# Patient Record
Sex: MALE
[De-identification: names, ages, dates, MRNs, and addresses within clinical notes are randomized per-mention and may not be internally consistent; named-entity substitution may affect disease eponyms.]

---

## 2021-08-11 ENCOUNTER — HOSPITAL ENCOUNTER (OUTPATIENT)
Dept: HOSPITAL 5 - OR | Age: 86
Discharge: HOME | End: 2021-08-11
Attending: UROLOGY
Payer: MEDICARE

## 2021-08-11 VITALS — SYSTOLIC BLOOD PRESSURE: 114 MMHG | DIASTOLIC BLOOD PRESSURE: 55 MMHG

## 2021-08-11 DIAGNOSIS — D41.4: Primary | ICD-10-CM

## 2021-08-11 DIAGNOSIS — I48.91: ICD-10-CM

## 2021-08-11 DIAGNOSIS — Z98.890: ICD-10-CM

## 2021-08-11 DIAGNOSIS — E11.9: ICD-10-CM

## 2021-08-11 DIAGNOSIS — E03.9: ICD-10-CM

## 2021-08-11 DIAGNOSIS — Z79.899: ICD-10-CM

## 2021-08-11 LAB
APTT BLD: 30.8 SEC. (ref 24.2–36.6)
INR PPP: 1.08 (ref 0.87–1.13)

## 2021-08-11 PROCEDURE — 88112 CYTOPATH CELL ENHANCE TECH: CPT

## 2021-08-11 PROCEDURE — 82962 GLUCOSE BLOOD TEST: CPT

## 2021-08-11 PROCEDURE — 85610 PROTHROMBIN TIME: CPT

## 2021-08-11 PROCEDURE — 36415 COLL VENOUS BLD VENIPUNCTURE: CPT

## 2021-08-11 PROCEDURE — 52240 CYSTOSCOPY AND TREATMENT: CPT

## 2021-08-11 PROCEDURE — 85730 THROMBOPLASTIN TIME PARTIAL: CPT

## 2021-08-11 PROCEDURE — 88305 TISSUE EXAM BY PATHOLOGIST: CPT

## 2021-08-11 PROCEDURE — 88307 TISSUE EXAM BY PATHOLOGIST: CPT

## 2021-08-11 NOTE — ANESTHESIA CONSULTATION
Anesthesia Consult and Med Hx


Date of service: 08/11/21





- Airway


Anesthetic Teeth Evaluation: Bridges


ROM Head & Neck: Inadequate


Mental/Hyoid Distance: Adequate


Mallampati Class: Class III


Intubation Access Assessment: Probably Good





- Pre-Operative Health Status


ASA Pre-Surgery Classification: ASA3


Proposed Anesthetic Plan: General





- Pulmonary


Hx Smoking: No


Hx Respiratory Symptoms: No (Climbs stairs at home and can walk 20 minutes)


Hx Sleep Apnea: No (FÉLIX PRE SCREEN LOW RISK)





- Cardiovascular System


Hx Hypertension: No (ECHO 2015)


Hx Angina: No


Hx Cardia Arrhythmia: Yes (AFib)





- Central Nervous System


Hx Neuromuscular Disorder: Yes (RLS. Severe vertigo)


CVA: Yes (-VS TIA , ONLY DEFICIT = POOR MEMORY)


Hx Back Pain: Yes (NECK AND BACK PAIN)


Hx Psychiatric Problems: No





- Gastrointestinal


Hx Gastroesophageal Reflux Disease: No





- Endocrine


Hx Renal Disease: No


Hx Non-Insulin Dependent Diabetes: Yes (Borderline)


Hx Thyroid Disease: Yes


Hx Hypothyroidism: Yes (DAILY MEDS)





- Hematic


Hx Anemia: No





- Other Systems


Hx Cancer: No (PT/ WIFE DENIES CANCER)

## 2021-08-11 NOTE — POST OPERATIVE NOTE
Pre-op diagnosis: Bladder tumor


Post-op diagnosis: same


Findings: 


Large right lateral wall bladder tumor and smaller left lateral bladder tumor


Procedure: 


TURBT, large, > 5 cm, of two separate and distinct tumors


Anesthesia: ORLANDOA


Surgeon: RANDA CHRISTIANSEN


Estimated blood loss: minimal


Pathology: list (1. Urine for cytology 2. Left lateral wall tumor 3. right 

lateral wall tumor)


Specimen disposition: to lab


Condition: stable


Disposition: PACU (Patient tolerated procedure well.)

## 2021-08-19 NOTE — OPERATIVE REPORT
DATE OF SURGERY: 08/11/2021



PREOPERATIVE DIAGNOSES:

1.  Gross hematuria.

2.  Bladder tumor.



POSTOPERATIVE DIAGNOSES:

1.  Gross hematuria.

2.  Bladder tumor.



OPERATIVE PROCEDURE PERFORMED:  Transurethral resection of bladder tumor, large,

greater than 5 cm, dominant tumor on the left trigone with a tumor on the right 

lateral wall.



ATTENDING:  Lowell Ohara MD



ANESTHESIA:  General.



ESTIMATED BLOOD LOSS:  50 mL.



SPECIMENS:

1.  Urine for cytology.

2.  Bladder tumor, left trigone.

3.  Bladder tumor, right lateral wall.



DRAINS:  A Chauhan catheter.



COMPLICATIONS:  None.



INDICATIONS FOR PROCEDURE:  The patient is an 85-year-old man who presented with

gross hematuria.  Evaluation revealed a bladder tumor.  He was brought to the 

operating room for transurethral resection of the bladder tumor.



DESCRIPTION OF PROCEDURE:  After induction of suitable anesthesia, the patient 

was positioned and prepared in the dorsal lithotomy position.  A resectoscope 

was used to enter the bladder under direct visualization.  The patient did have 

a large prostate.  He had 2 tumors in the bladder.  Both of them were large.  

Both of them were greater than 5 cm.  There was a tumor at the left 

trigone/lateral wall that was near the left ureteral orifice.  This tumor was 

resected carefully and completely.  The tumor bed was fulgurated copiously and 

carefully.  It was hemostatic at the completion.  There was also a tumor on the 

right lateral wall, which was also resected completely.  Hemostasis was also 

achieved on the right lateral wall tumor bed.  All the tumor specimen was 

retrieved removed and passed off to pathology for review.  The resection beds 

were carefully inspected after fulguration and they were hemostatic.  The 

bladder was then emptied and the scope was removed.  A Chauhan catheter was 

placed.



The patient was then awakened from anesthesia and transported to the recovery 

room in stable condition.  He tolerated the procedure well.



He will return in the office in 1-2 days for Chauhan catheter removal.  He will 

return after that for pathology review and discussion of next steps.







DD: 08/19/2021 04:48 PM

DT: 08/19/2021 05:21 PM

TID: [id number] RECEIPT: [id number]

DEEPTI/LIZZ

## 2021-10-18 LAB
ALBUMIN SERPL-MCNC: 4.2 G/DL (ref 3.9–5)
ALT SERPL-CCNC: 16 UNITS/L (ref 7–56)
BUN SERPL-MCNC: 19 MG/DL (ref 9–20)
BUN/CREAT SERPL: 19 %
CALCIUM SERPL-MCNC: 9.7 MG/DL (ref 8.4–10.2)
HCT VFR BLD CALC: 38.9 % (ref 35.5–45.6)
HEMOLYSIS INDEX: 4
HGB BLD-MCNC: 13.2 GM/DL (ref 11.8–15.2)
MCHC RBC AUTO-ENTMCNC: 34 % (ref 32–34)
MCV RBC AUTO: 95 FL (ref 84–94)
PLATELET # BLD: 157 K/MM3 (ref 140–440)
RBC # BLD AUTO: 4.11 M/MM3 (ref 3.65–5.03)

## 2021-10-20 ENCOUNTER — HOSPITAL ENCOUNTER (OUTPATIENT)
Dept: HOSPITAL 5 - OR | Age: 86
Discharge: HOME | End: 2021-10-20
Attending: UROLOGY
Payer: MEDICARE

## 2021-10-20 VITALS — DIASTOLIC BLOOD PRESSURE: 58 MMHG | SYSTOLIC BLOOD PRESSURE: 118 MMHG

## 2021-10-20 DIAGNOSIS — Z86.73: ICD-10-CM

## 2021-10-20 DIAGNOSIS — I48.91: ICD-10-CM

## 2021-10-20 DIAGNOSIS — G25.81: ICD-10-CM

## 2021-10-20 DIAGNOSIS — Z79.899: ICD-10-CM

## 2021-10-20 DIAGNOSIS — E03.9: ICD-10-CM

## 2021-10-20 DIAGNOSIS — Z46.6: ICD-10-CM

## 2021-10-20 DIAGNOSIS — R33.9: Primary | ICD-10-CM

## 2021-10-20 DIAGNOSIS — Z98.890: ICD-10-CM

## 2021-10-20 DIAGNOSIS — Z20.822: ICD-10-CM

## 2021-10-20 PROCEDURE — U0003 INFECTIOUS AGENT DETECTION BY NUCLEIC ACID (DNA OR RNA); SEVERE ACUTE RESPIRATORY SYNDROME CORONAVIRUS 2 (SARS-COV-2) (CORONAVIRUS DISEASE [COVID-19]), AMPLIFIED PROBE TECHNIQUE, MAKING USE OF HIGH THROUGHPUT TECHNOLOGIES AS DESCRIBED BY CMS-2020-01-R: HCPCS

## 2021-10-20 PROCEDURE — 36415 COLL VENOUS BLD VENIPUNCTURE: CPT

## 2021-10-20 PROCEDURE — 53854 TRURL DSTRJ PRST8 TISS RF WV: CPT

## 2021-10-20 PROCEDURE — 86900 BLOOD TYPING SEROLOGIC ABO: CPT

## 2021-10-20 PROCEDURE — 52310 CYSTOSCOPY AND TREATMENT: CPT

## 2021-10-20 PROCEDURE — 86850 RBC ANTIBODY SCREEN: CPT

## 2021-10-20 PROCEDURE — 82962 GLUCOSE BLOOD TEST: CPT

## 2021-10-20 PROCEDURE — 86901 BLOOD TYPING SEROLOGIC RH(D): CPT

## 2021-10-20 PROCEDURE — 85027 COMPLETE CBC AUTOMATED: CPT

## 2021-10-20 PROCEDURE — 80053 COMPREHEN METABOLIC PANEL: CPT

## 2021-10-20 PROCEDURE — 84132 ASSAY OF SERUM POTASSIUM: CPT

## 2021-10-20 NOTE — OPERATIVE REPORT
DATE OF SURGERY: 10/20/2021



PREOPERATIVE DIAGNOSES:

1.  Urinary retention requiring Chauhan catheterization.

2.  Indwelling left double-J stent secondary to bladder cancer.



POSTOPERATIVE DIAGNOSES:

1.  Urinary retention requiring Chauhan catheterization.

2.  Indwelling left double-J stent secondary to bladder cancer.



OPERATIVE PROCEDURES:

1.  Cystoscopy, left double-J stent removal.

2.  Rezum procedure.



ATTENDING:  Lowell Ohara MD



ASSISTANT:  None.



ANESTHESIA:  General.



ESTIMATED BLOOD LOSS:  20 mL.



SPECIMENS:  Left double-J stent for gross only.



COMPLICATIONS:  None.



DRAINS:  A 20-Ethiopian Chauhan catheter.



INDICATIONS FOR PROCEDURE:  The patient is an 86-year-old man with a history of 

bladder cancer, status post tumor resection.  He had a left double-J stent 

placed during tumor resection a couple of months ago to protect the left ureter.

 Also, the patient has been in urinary retention since his transurethral 

resection of the bladder tumor.  He has failed multiple void trials and would 

like to undergo outlet reduction, so that he could be free of a catheter.  He 

presents today for both procedures.



DESCRIPTION OF PROCEDURE IN DETAIL:  After the induction of suitable anesthesia 

and proper positioning in preparation of the dorsal lithotomy position, a 

cystoscope was used to enter the bladder under direct visualization.  The left 

double-J stent was seen emanating from the left ureteral orifice and it was 

grabbed with a flexible grasper and pulled out per urethra intact.  The 

cystoscope was then used to reexamine the bladder and there were no obvious 

tumors.  The patient had an enlarged prostate, specifically lateral lobe 

hypertrophy.  His median lobe was nonobstructive.



At this point, the cystoscope was then substituted for the Rezum device.  We 

were able to navigate the Rezum device under direct visualization into the 

bladder.  We then measured the prostate, and it was approximately 2 cm.  We 

decided to do 3 treatments on each lateral lobe in a staggered format.  Two 

treatments were lower and one treatment was higher, so that we could have 

maximal steam delivery to the lateral lobe for outlet reduction.  This was 

performed on the right and then the left side.  The 3 treatments were performed 

per side.  The treatments were uneventful and there was no bleeding.  Once 

complete, the outlet was reexamined to ensure that we had good coverage, which 

there was.  The bladder was clear.  The device was removed and a Chauhan catheter 

was placed.



The patient was then awakened from anesthesia and transported to recovery room 

in stable condition.  He tolerated the procedure well.



He should return in approximately 1 week for Chauhan catheter removal.







DD: 10/20/2021 11:37 AM

DT: 10/20/2021 12:22 PM

TID: [id number] RECEIPT: [id number]

FIOR

## 2022-05-05 LAB
ALBUMIN SERPL-MCNC: 4.4 G/DL (ref 3.9–5)
ALT SERPL-CCNC: 22 UNITS/L (ref 7–56)
BUN SERPL-MCNC: 26 MG/DL (ref 9–20)
BUN/CREAT SERPL: 29 %
CALCIUM SERPL-MCNC: 9.2 MG/DL (ref 8.4–10.2)
HCT VFR BLD CALC: 40.3 % (ref 35.5–45.6)
HEMOLYSIS INDEX: 14
HGB BLD-MCNC: 13.5 GM/DL (ref 11.8–15.2)
MCHC RBC AUTO-ENTMCNC: 34 % (ref 32–34)
MCV RBC AUTO: 92 FL (ref 84–94)
PLATELET # BLD: 112 K/MM3 (ref 140–440)
RBC # BLD AUTO: 4.41 M/MM3 (ref 3.65–5.03)

## 2022-05-05 NOTE — ANESTHESIA CONSULTATION
Anesthesia Consult and Med Hx


Date of service: 05/11/22





- Pre-Operative Health Status


ASA Pre-Surgery Classification: ASA3


Proposed Anesthetic Plan: General





- Pulmonary


Hx Smoking: No


Hx Respiratory Symptoms: No


Hx Sleep Apnea: No (FÉLIX PRE SCREEN HIGH RISK)





- Cardiovascular System


Hx Hypertension: No


Hx Heart Attack/AMI: No


Hx Angina: No


Hx Cardia Arrhythmia: Yes (AFib. +Cardiac clearance)





- Central Nervous System


Hx Neuromuscular Disorder: Yes (RLS. Severe vertigo)


Hx Seizures: No


CVA: Yes (-VS TIA , ONLY DEFICIT = POOR MEMORY)


Hx Back Pain: Yes (NECK AND BACK PAIN)


Hx Psychiatric Problems: No





- Gastrointestinal


Hx Gastroesophageal Reflux Disease: No





- Endocrine


Hx Renal Disease: No


Hx Cirrhosis: No


Hx Non-Insulin Dependent Diabetes: Yes (Borderline)


Hx Thyroid Disease: Yes


Hx Hypothyroidism: Yes (DAILY MEDS)





- Hematic


Hx Anemia: No


Hx Sickle Cell Disease: No





- Other Systems


Hx Alcohol Use: No


Hx Substance Use: No


Hx Cancer: Yes


Hx Obesity: No





- Additional Comments


Anesthesia Medical History Comments: Here [id number] and [id number]

## 2022-05-11 ENCOUNTER — HOSPITAL ENCOUNTER (OUTPATIENT)
Dept: HOSPITAL 5 - OR | Age: 87
Discharge: HOME | End: 2022-05-11
Attending: UROLOGY
Payer: MEDICARE

## 2022-05-11 VITALS — DIASTOLIC BLOOD PRESSURE: 50 MMHG | SYSTOLIC BLOOD PRESSURE: 107 MMHG

## 2022-05-11 DIAGNOSIS — Z85.89: ICD-10-CM

## 2022-05-11 DIAGNOSIS — N21.0: Primary | ICD-10-CM

## 2022-05-11 DIAGNOSIS — Z98.41: ICD-10-CM

## 2022-05-11 DIAGNOSIS — Z20.822: ICD-10-CM

## 2022-05-11 DIAGNOSIS — Z86.73: ICD-10-CM

## 2022-05-11 DIAGNOSIS — Z85.51: ICD-10-CM

## 2022-05-11 DIAGNOSIS — I48.91: ICD-10-CM

## 2022-05-11 DIAGNOSIS — E03.9: ICD-10-CM

## 2022-05-11 DIAGNOSIS — I42.9: ICD-10-CM

## 2022-05-11 DIAGNOSIS — Z98.42: ICD-10-CM

## 2022-05-11 DIAGNOSIS — Z98.52: ICD-10-CM

## 2022-05-11 DIAGNOSIS — E11.9: ICD-10-CM

## 2022-05-11 DIAGNOSIS — E78.00: ICD-10-CM

## 2022-05-11 DIAGNOSIS — Z87.442: ICD-10-CM

## 2022-05-11 DIAGNOSIS — Z98.890: ICD-10-CM

## 2022-05-11 PROCEDURE — U0003 INFECTIOUS AGENT DETECTION BY NUCLEIC ACID (DNA OR RNA); SEVERE ACUTE RESPIRATORY SYNDROME CORONAVIRUS 2 (SARS-COV-2) (CORONAVIRUS DISEASE [COVID-19]), AMPLIFIED PROBE TECHNIQUE, MAKING USE OF HIGH THROUGHPUT TECHNOLOGIES AS DESCRIBED BY CMS-2020-01-R: HCPCS

## 2022-05-11 PROCEDURE — 36415 COLL VENOUS BLD VENIPUNCTURE: CPT

## 2022-05-11 PROCEDURE — 85027 COMPLETE CBC AUTOMATED: CPT

## 2022-05-11 PROCEDURE — 52224 CYSTOSCOPY AND TREATMENT: CPT

## 2022-05-11 PROCEDURE — 82962 GLUCOSE BLOOD TEST: CPT

## 2022-05-11 PROCEDURE — 82365 CALCULUS SPECTROSCOPY: CPT

## 2022-05-11 PROCEDURE — 80053 COMPREHEN METABOLIC PANEL: CPT

## 2022-05-11 NOTE — OPERATIVE REPORT
DATE OF SURGERY: 05/11/2022



TIME OF PROCEDURE:  At approximately 9:45 a.m.



PREOPERATIVE DIAGNOSES:t

1.  History of bladder cancer.

2.  Bladder tumor.

3.  Bladder stones.



POSTOPERATIVE DIAGNOSES:

1.  History of bladder cancer.

2.  Bladder tumor.

3.  Bladder stones.



OPERATIVE PROCEDURES:

1.  Cystoscopy, fulguration of 5 small bladder lesions.

2.  Cystolitholapaxy, less than 2.5 cm.



ATTENDING:  Lowell Ohara MD



ANESTHESIA:  General.



ESTIMATED BLOOD LOSS:  10 mL.



DRAINS:  A 20-Bengali Chauhan catheter.



SPECIMENS:  Bladder stones.



INDICATIONS FOR PROCEDURE:  The patient is an 86-year-old man with a history of 

bladder cancer. On surveillance cystoscopy, he was found to have multiple small 

papillary lesions.  He was also found to have multiple bladder stones.  He was 

brought to the operating room for fulguration of these lesions and bladder stone

removal.



DESCRIPTION OF PROCEDURE:  After the induction of suitable anesthesia and proper

positioning and preparation in the dorsal lithotomy position, a resectoscope was

used to enter the bladder under direct visualization.  Five small papillary 

lesions, all less than 0.5 cm, were identified; and using the Bugbee electrode, 

these lesions were fulgurated extensively.  There were no other lesions in the 

bladder.



Attention was then turned to the bladder stones and these were removed with 

mainly irrigation and grasping of bigger stones with the Bugbee electrode.  Some

stones had to be removed one by one with the Bugbee electrode.  However, most of

the stones were able to be irrigated out with the Ellik.  I examined the trigone

multiple times to ensure that all stones were removed, which they were.



Once complete, the resectoscope was removed and a Chauhan catheter was placed.  It

was attached to gravity drainage.



The patient was then awakened from anesthesia and transported to the recovery 

room in stable condition.  He tolerated the procedure well.



The patient will come back in approximately 2 days for catheter removal.







DD: 05/11/2022 11:11 AM

DT: 05/11/2022 12:28 PM

TID: [id number] RECEIPT: [id number]

DEEPTI/BECCA/JAYCEE